# Patient Record
Sex: FEMALE | Race: WHITE
[De-identification: names, ages, dates, MRNs, and addresses within clinical notes are randomized per-mention and may not be internally consistent; named-entity substitution may affect disease eponyms.]

---

## 2020-07-15 ENCOUNTER — HOSPITAL ENCOUNTER (EMERGENCY)
Dept: HOSPITAL 74 - JER | Age: 80
Discharge: HOME | End: 2020-07-15
Payer: COMMERCIAL

## 2020-07-15 VITALS — DIASTOLIC BLOOD PRESSURE: 71 MMHG | SYSTOLIC BLOOD PRESSURE: 190 MMHG

## 2020-07-15 VITALS — HEART RATE: 78 BPM | TEMPERATURE: 98.1 F

## 2020-07-15 VITALS — BODY MASS INDEX: 30 KG/M2

## 2020-07-15 DIAGNOSIS — M25.50: Primary | ICD-10-CM

## 2020-07-15 LAB
ALBUMIN SERPL-MCNC: 3.1 G/DL (ref 3.4–5)
ALP SERPL-CCNC: 96 U/L (ref 45–117)
ALT SERPL-CCNC: 23 U/L (ref 13–61)
ANION GAP SERPL CALC-SCNC: 8 MMOL/L (ref 8–16)
AST SERPL-CCNC: 15 U/L (ref 15–37)
BASOPHILS # BLD: 0.8 % (ref 0–2)
BILIRUB SERPL-MCNC: 0.3 MG/DL (ref 0.2–1)
BUN SERPL-MCNC: 16.4 MG/DL (ref 7–18)
CALCIUM SERPL-MCNC: 9.4 MG/DL (ref 8.5–10.1)
CHLORIDE SERPL-SCNC: 106 MMOL/L (ref 98–107)
CO2 SERPL-SCNC: 25 MMOL/L (ref 21–32)
CREAT SERPL-MCNC: 0.6 MG/DL (ref 0.55–1.3)
DEPRECATED RDW RBC AUTO: 14.4 % (ref 11.6–15.6)
EOSINOPHIL # BLD: 2 % (ref 0–4.5)
ERYTHROCYTE [SEDIMENTATION RATE] IN BLOOD BY WESTERGREN METHOD: 91 MM/HR (ref 0–30)
GLUCOSE SERPL-MCNC: 179 MG/DL (ref 74–106)
HCT VFR BLD CALC: 35.2 % (ref 32.4–45.2)
HGB BLD-MCNC: 11.5 GM/DL (ref 10.7–15.3)
LYMPHOCYTES # BLD: 14.3 % (ref 8–40)
MCH RBC QN AUTO: 27.8 PG (ref 25.7–33.7)
MCHC RBC AUTO-ENTMCNC: 32.7 G/DL (ref 32–36)
MCV RBC: 85.2 FL (ref 80–96)
MONOCYTES # BLD AUTO: 6.4 % (ref 3.8–10.2)
NEUTROPHILS # BLD: 76.5 % (ref 42.8–82.8)
PLATELET # BLD AUTO: 418 K/MM3 (ref 134–434)
PMV BLD: 7.1 FL (ref 7.5–11.1)
POTASSIUM SERPLBLD-SCNC: 4.4 MMOL/L (ref 3.5–5.1)
PROT SERPL-MCNC: 7.1 G/DL (ref 6.4–8.2)
RBC # BLD AUTO: 4.13 M/MM3 (ref 3.6–5.2)
SODIUM SERPL-SCNC: 139 MMOL/L (ref 136–145)
URATE SERPL-SCNC: 3.3 MG/DL (ref 2.6–7.2)
WBC # BLD AUTO: 10.2 K/MM3 (ref 4–10)

## 2020-07-15 PROCEDURE — 3E033NZ INTRODUCTION OF ANALGESICS, HYPNOTICS, SEDATIVES INTO PERIPHERAL VEIN, PERCUTANEOUS APPROACH: ICD-10-PCS

## 2020-07-15 NOTE — PDOC
Documentation entered by Marques Rushing SCRIBE, acting as scribe for 

Joseph Tadeo MD.








Joseph Tadeo MD:  This documentation has been prepared by the [facility]

inMarques SCRIBE, under my direction and personally reviewed by me in its 

entirety.  I confirm that the documentation accurately reflects all work, 

treatment, procedures, and medical decision making performed by me.  





History of Present Illness





- General


Chief Complaint: Chronic pain


Stated Complaint: KNEE PAIN


Time Seen by Provider: 07/15/20 08:42


History Source: Patient


Exam Limitations: No Limitations





- History of Present Illness


Initial Comments: 





07/15/20 09:01


The patient is a 79 year old female with a significant past medical history of 

HL, HTN, DM, asthma who presents to the ED with bilateral knee, bilateral wrist,

bilateral shoulder pain and swelling. Pt was diagnosed with arthritis and stiff 

muscles about one year ago. Pt goes to physical therapy with Dr. Morse but has 

only been twice because of COVID-19. Pt endorses taking Tylenol (last dose 

yesterday) and Motrin for pain relief. 





The patient denies chest pain and shortness of breath. Denies fever, chills 

and/or any GI symptoms. Denies any  symptoms. Denies any other symptoms. 





Allergies: NKDA


PCP: Dr. Pantoja


Ortho: Dr. Morse








Past History





- Medical History


Allergies/Adverse Reactions: 


                                    Allergies











Allergy/AdvReac Type Severity Reaction Status Date / Time


 


No Known Allergies Allergy   Verified 07/15/20 08:18











Home Medications: 


Ambulatory Orders





Atorvastatin Ca [Lipitor] 10 mg PO HS 08/09/16 


Lisinopril [Prinivil] 20 mg PO HS 08/09/16 


Diclofenac Sodium 75 mg PO BID PRN 06/11/20 


Escitalopram Oxalate [Lexapro -] 10 mg PO HS 06/11/20 


Ibuprofen [Motrin Ib] 400 mg PO TID PRN 06/11/20 


Omeprazole 40 mg PO HS 06/11/20 


metFORMIN HCL [Glucophage -] 500 mg PO HS 06/11/20 


Oxycodone HCl/Acetaminophen [Percocet 5-325 mg Tablet] 1 tab PO Q6H #20 tablet 

MDD 3 07/15/20 








Asthma: Yes


Cardiac Disorders: Yes (MURMUR)


COPD: No


Diabetes: Yes


HTN: Yes


Hypercholesterolemia: Yes


Psychiatric Problems: Yes (DEPRESSION)





- Surgical History


Cholecystectomy: Yes





- Psycho-Social/Smoking History


Smoking History: Never smoked


Have you smoked in the past 12 months: No


Information on smoking cessation initiated: No





- Substance Abuse Hx (Audit-C & DAST Scrn)


How often the patient has a drink containing alcohol: Never


Score: In Men: 4 or > Positive; In Women: 3 or > Positive: 0


Screen Result (Pos requires Nsg. Audit-10AR): Negative


In the last yr the pt used illegal drug/Rx for NonMed reason: No


Score:  Yes response is considered Positive: 0


Screen Result (Positive result requires Nsg. DAST-10): Negative





**Review of Systems





- Review of Systems


Able to Perform ROS?: Yes


Comments:: 





07/15/20 09:01


CONSTITUTIONAL: No fever, no chills, no fatigue


EYES: No visual changes


ENT: No ear pain, no sore throat


CARDIOVASCULAR: No chest pain, no palpitations


RESPIRATORY: No cough, no SOB


GI: No abdominal pain, no nausea, no vomiting, no constipation, no diarrhea


GENITOURINARY: No dysuria, no frequency, no hematuria


MUSKULOSKELETAL: +bilateral knee, bilateral wrist, bilateral shoulder pain and 

swelling


SKIN: No rash


NEURO: No headache





All Other Systems: Reviewed and Negative





*Physical Exam





- Vital Signs


                                Last Vital Signs











Temp Pulse Resp BP Pulse Ox


 


 100 F H  86   17   205/83 H  99 


 


 07/15/20 07:35  07/15/20 07:35  07/15/20 07:35  07/15/20 07:35  07/15/20 08:41














- Physical Exam





07/15/20 13:03








EXAMINATION 





CONSTITUTIONAL: Awake and alert, obese, in moderatedistress


HEAD: Normocephalic; atraumatic


EYES: PERRL; EOM intact 


ENMT: External appears normal; normal oropharynx


NECK: Supple; non-tender; no cervical lymphadenopathy


CARD: Normal S1, S2; no murmurs, rubs, or gallops


RESP: Normal chest excursion with respiration; breath sounds clear and equal 

bilaterally; no wheezes, rhonchi, or rales


ABD: Soft, non-distended; non-tender; no palpable organomegaly, no palpable 

hernias


EXT: + Edema is noted to the bilateral wrist joints, bilateral knee joints with 

limited passive range of motion at the shoulder joint, elbows, wrists and knees 

consistent with polyarthritis.


SKIN: Warm, dry, no rash


NEURO: No focal neurological deficiencies. 








ED Treatment Course





- LABORATORY


CBC & Chemistry Diagram: 


                                 07/15/20 09:13





                                 07/15/20 09:13





Medical Decision Making





- Medical Decision Making





07/15/20 13:05


Patient is a 79-year-old female with history of hypertension and arthritis who 

presents with diffuse polyarthritis for the past several days.  I suspect 

osteoarthritis, however, rheumatoid arthritis cannot be ruled out at this time. 

Will administer IV Tylenol.  Will obtain CBC/CMP/CRP/ESR.  I do not suspect Lyme

arthritis at this time.  No indication for imaging.  Gouty arthritis is also 

highly unlikely.  Will discharge with pain meds with outpatient follow-up.





Discharge





- Discharge Information


Problems reviewed: Yes


Clinical Impression/Diagnosis: 


 Arthritis





Joint pain


Qualifiers:


 Joint pain location: unspecified Qualified Code(s): M25.50 - Pain in 

unspecified joint





Condition: Stable





- Follow up/Referral


Referrals: 


Lisa Pantoja [Primary Care Provider] - 





- Patient Discharge Instructions


Patient Printed Discharge Instructions:  [facility]





- Post Discharge Activity

## 2020-10-11 ENCOUNTER — HOSPITAL ENCOUNTER (INPATIENT)
Dept: HOSPITAL 74 - JER | Age: 80
LOS: 4 days | Discharge: ANOTHER HOSPITAL | DRG: 280 | End: 2020-10-15
Attending: INTERNAL MEDICINE | Admitting: HOSPITALIST
Payer: COMMERCIAL

## 2020-10-11 VITALS — BODY MASS INDEX: 30.9 KG/M2

## 2020-10-11 DIAGNOSIS — I48.91: ICD-10-CM

## 2020-10-11 DIAGNOSIS — L50.0: ICD-10-CM

## 2020-10-11 DIAGNOSIS — I10: ICD-10-CM

## 2020-10-11 DIAGNOSIS — I21.4: Primary | ICD-10-CM

## 2020-10-11 DIAGNOSIS — I31.3: ICD-10-CM

## 2020-10-11 DIAGNOSIS — M41.9: ICD-10-CM

## 2020-10-11 DIAGNOSIS — E11.9: ICD-10-CM

## 2020-10-11 DIAGNOSIS — J98.11: ICD-10-CM

## 2020-10-11 DIAGNOSIS — E87.2: ICD-10-CM

## 2020-10-11 DIAGNOSIS — T46.1X5A: ICD-10-CM

## 2020-10-11 DIAGNOSIS — D72.829: ICD-10-CM

## 2020-10-11 DIAGNOSIS — I50.21: ICD-10-CM

## 2020-10-11 DIAGNOSIS — E66.9: ICD-10-CM

## 2020-10-11 DIAGNOSIS — J45.909: ICD-10-CM

## 2020-10-11 DIAGNOSIS — F41.8: ICD-10-CM

## 2020-10-11 DIAGNOSIS — N28.1: ICD-10-CM

## 2020-10-11 DIAGNOSIS — E78.5: ICD-10-CM

## 2020-10-11 DIAGNOSIS — N39.0: ICD-10-CM

## 2020-10-11 LAB
ALBUMIN SERPL-MCNC: 3.8 G/DL (ref 3.4–5)
ALP SERPL-CCNC: 94 U/L (ref 45–117)
ALT SERPL-CCNC: 45 U/L (ref 13–61)
ANION GAP SERPL CALC-SCNC: 7 MMOL/L (ref 8–16)
ANION GAP SERPL CALC-SCNC: 8 MMOL/L (ref 8–16)
APPEARANCE UR: (no result)
APTT BLD: 21.8 SECONDS (ref 25.2–36.5)
AST SERPL-CCNC: 31 U/L (ref 15–37)
BACTERIA # UR AUTO: 735 /UL (ref 0–1359)
BASE EXCESS BLDV CALC-SCNC: -1.1 MMOL/L (ref -2–2)
BASOPHILS # BLD: 0.7 % (ref 0–2)
BILIRUB CONJ SERPL-MCNC: 0.2 MG/DL (ref 0–0.2)
BILIRUB DIRECT SERPL-MCNC: 282 U/L (ref 84–246)
BILIRUB SERPL-MCNC: 0.6 MG/DL (ref 0.2–1)
BILIRUB UR STRIP.AUTO-MCNC: NEGATIVE MG/DL
BNP SERPL-MCNC: 1045 PG/ML (ref 5–450)
BUN SERPL-MCNC: 15 MG/DL (ref 7–18)
BUN SERPL-MCNC: 19.5 MG/DL (ref 7–18)
CALCIUM SERPL-MCNC: 9 MG/DL (ref 8.5–10.1)
CALCIUM SERPL-MCNC: 9.3 MG/DL (ref 8.5–10.1)
CASTS URNS QL MICRO: 1 /UL (ref 0–3.1)
CHLORIDE SERPL-SCNC: 104 MMOL/L (ref 98–107)
CHLORIDE SERPL-SCNC: 107 MMOL/L (ref 98–107)
CO2 SERPL-SCNC: 25 MMOL/L (ref 21–32)
CO2 SERPL-SCNC: 27 MMOL/L (ref 21–32)
COLOR UR: YELLOW
CREAT SERPL-MCNC: 0.6 MG/DL (ref 0.55–1.3)
CREAT SERPL-MCNC: 0.7 MG/DL (ref 0.55–1.3)
DEPRECATED RDW RBC AUTO: 15.5 % (ref 11.6–15.6)
EOSINOPHIL # BLD: 1.6 % (ref 0–4.5)
EPITH CASTS URNS QL MICRO: >36 /UL (ref 0–25.1)
GLUCOSE SERPL-MCNC: 152 MG/DL (ref 74–106)
GLUCOSE SERPL-MCNC: 217 MG/DL (ref 74–106)
HCT VFR BLD CALC: 39 % (ref 32.4–45.2)
HGB BLD-MCNC: 12.8 GM/DL (ref 10.7–15.3)
INR BLD: 1.13 (ref 0.83–1.09)
KETONES UR QL STRIP: NEGATIVE
LEUKOCYTE ESTERASE UR QL STRIP.AUTO: (no result)
LYMPHOCYTES # BLD: 19 % (ref 8–40)
MCH RBC QN AUTO: 28.7 PG (ref 25.7–33.7)
MCHC RBC AUTO-ENTMCNC: 32.8 G/DL (ref 32–36)
MCV RBC: 87.3 FL (ref 80–96)
MONOCYTES # BLD AUTO: 7.6 % (ref 3.8–10.2)
NEUTROPHILS # BLD: 71.1 % (ref 42.8–82.8)
NITRITE UR QL STRIP: NEGATIVE
PCO2 BLDV: 47 MMHG (ref 38–52)
PH BLDV: 7.34 [PH] (ref 7.31–7.41)
PH UR: 6 [PH] (ref 5–8)
PLATELET # BLD AUTO: 280 K/MM3 (ref 134–434)
PMV BLD: 7.9 FL (ref 7.5–11.1)
POTASSIUM SERPLBLD-SCNC: 4.5 MMOL/L (ref 3.5–5.1)
POTASSIUM SERPLBLD-SCNC: 4.5 MMOL/L (ref 3.5–5.1)
PROT SERPL-MCNC: 7.6 G/DL (ref 6.4–8.2)
PROT UR QL STRIP: (no result)
PROT UR QL STRIP: NEGATIVE
PT PNL PPP: 13.3 SEC (ref 9.7–13)
RBC # BLD AUTO: 16.1 /UL (ref 0–23.9)
RBC # BLD AUTO: 4.47 M/MM3 (ref 3.6–5.2)
SAO2 % BLDV: 79.3 % (ref 70–80)
SODIUM SERPL-SCNC: 139 MMOL/L (ref 136–145)
SODIUM SERPL-SCNC: 139 MMOL/L (ref 136–145)
SP GR UR: 1.02 (ref 1.01–1.03)
UROBILINOGEN UR STRIP-MCNC: 0.2 MG/DL (ref 0.2–1)
WBC # BLD AUTO: 10.8 K/MM3 (ref 4–10)
WBC # UR AUTO: 30 /UL (ref 0–25.8)

## 2020-10-11 PROCEDURE — G0008 ADMIN INFLUENZA VIRUS VAC: HCPCS

## 2020-10-11 PROCEDURE — C9803 HOPD COVID-19 SPEC COLLECT: HCPCS

## 2020-10-11 PROCEDURE — U0003 INFECTIOUS AGENT DETECTION BY NUCLEIC ACID (DNA OR RNA); SEVERE ACUTE RESPIRATORY SYNDROME CORONAVIRUS 2 (SARS-COV-2) (CORONAVIRUS DISEASE [COVID-19]), AMPLIFIED PROBE TECHNIQUE, MAKING USE OF HIGH THROUGHPUT TECHNOLOGIES AS DESCRIBED BY CMS-2020-01-R: HCPCS

## 2020-10-11 RX ADMIN — APIXABAN SCH MG: 5 TABLET, FILM COATED ORAL at 22:06

## 2020-10-12 RX ADMIN — LISINOPRIL SCH: 5 TABLET ORAL at 13:36

## 2020-10-12 RX ADMIN — INSULIN ASPART SCH UNITS: 100 INJECTION, SOLUTION INTRAVENOUS; SUBCUTANEOUS at 06:15

## 2020-10-12 RX ADMIN — INSULIN ASPART SCH UNITS: 100 INJECTION, SOLUTION INTRAVENOUS; SUBCUTANEOUS at 17:09

## 2020-10-12 RX ADMIN — APIXABAN SCH MG: 5 TABLET, FILM COATED ORAL at 21:47

## 2020-10-12 RX ADMIN — INSULIN ASPART SCH UNITS: 100 INJECTION, SOLUTION INTRAVENOUS; SUBCUTANEOUS at 21:47

## 2020-10-12 RX ADMIN — ATORVASTATIN CALCIUM SCH MG: 40 TABLET, FILM COATED ORAL at 21:47

## 2020-10-12 RX ADMIN — APIXABAN SCH MG: 5 TABLET, FILM COATED ORAL at 09:22

## 2020-10-12 RX ADMIN — INSULIN ASPART SCH UNITS: 100 INJECTION, SOLUTION INTRAVENOUS; SUBCUTANEOUS at 12:05

## 2020-10-12 RX ADMIN — LISINOPRIL SCH MG: 5 TABLET ORAL at 13:29

## 2020-10-13 LAB
ALBUMIN SERPL-MCNC: 3.6 G/DL (ref 3.4–5)
ALP SERPL-CCNC: 86 U/L (ref 45–117)
ALT SERPL-CCNC: 54 U/L (ref 13–61)
ANION GAP SERPL CALC-SCNC: 5 MMOL/L (ref 8–16)
AST SERPL-CCNC: 43 U/L (ref 15–37)
BASOPHILS # BLD: 0.5 % (ref 0–2)
BILIRUB SERPL-MCNC: 0.6 MG/DL (ref 0.2–1)
BUN SERPL-MCNC: 27.8 MG/DL (ref 7–18)
CALCIUM SERPL-MCNC: 9.1 MG/DL (ref 8.5–10.1)
CHLORIDE SERPL-SCNC: 99 MMOL/L (ref 98–107)
CHOLEST SERPL-MCNC: 175 MG/DL (ref 50–200)
CO2 SERPL-SCNC: 34 MMOL/L (ref 21–32)
CREAT SERPL-MCNC: 0.7 MG/DL (ref 0.55–1.3)
DEPRECATED RDW RBC AUTO: 15.6 % (ref 11.6–15.6)
EOSINOPHIL # BLD: 1.2 % (ref 0–4.5)
GLUCOSE SERPL-MCNC: 120 MG/DL (ref 74–106)
HCT VFR BLD CALC: 39.2 % (ref 32.4–45.2)
HDLC SERPL-MCNC: 50 MG/DL (ref 40–60)
HGB BLD-MCNC: 12.8 GM/DL (ref 10.7–15.3)
LDLC SERPL CALC-MCNC: 98 MG/DL (ref 5–100)
LYMPHOCYTES # BLD: 30.6 % (ref 8–40)
MCH RBC QN AUTO: 28.5 PG (ref 25.7–33.7)
MCHC RBC AUTO-ENTMCNC: 32.7 G/DL (ref 32–36)
MCV RBC: 87.2 FL (ref 80–96)
MONOCYTES # BLD AUTO: 5.7 % (ref 3.8–10.2)
NEUTROPHILS # BLD: 62 % (ref 42.8–82.8)
PLATELET # BLD AUTO: 313 K/MM3 (ref 134–434)
PMV BLD: 8.1 FL (ref 7.5–11.1)
POTASSIUM SERPLBLD-SCNC: 4 MMOL/L (ref 3.5–5.1)
PROT SERPL-MCNC: 7.3 G/DL (ref 6.4–8.2)
RBC # BLD AUTO: 4.5 M/MM3 (ref 3.6–5.2)
SODIUM SERPL-SCNC: 138 MMOL/L (ref 136–145)
TRIGL SERPL-MCNC: 132 MG/DL (ref 0–150)
WBC # BLD AUTO: 13.1 K/MM3 (ref 4–10)

## 2020-10-13 RX ADMIN — ATORVASTATIN CALCIUM SCH MG: 40 TABLET, FILM COATED ORAL at 21:54

## 2020-10-13 RX ADMIN — APIXABAN SCH MG: 5 TABLET, FILM COATED ORAL at 09:01

## 2020-10-13 RX ADMIN — FUROSEMIDE SCH MG: 20 TABLET ORAL at 10:57

## 2020-10-13 RX ADMIN — INSULIN ASPART SCH UNITS: 100 INJECTION, SOLUTION INTRAVENOUS; SUBCUTANEOUS at 06:27

## 2020-10-13 RX ADMIN — INSULIN ASPART SCH UNITS: 100 INJECTION, SOLUTION INTRAVENOUS; SUBCUTANEOUS at 21:54

## 2020-10-13 RX ADMIN — INSULIN ASPART SCH UNITS: 100 INJECTION, SOLUTION INTRAVENOUS; SUBCUTANEOUS at 16:45

## 2020-10-13 RX ADMIN — INSULIN ASPART SCH UNITS: 100 INJECTION, SOLUTION INTRAVENOUS; SUBCUTANEOUS at 11:00

## 2020-10-13 RX ADMIN — LISINOPRIL SCH MG: 5 TABLET ORAL at 10:57

## 2020-10-13 RX ADMIN — HEPARIN SODIUM SCH MLS/HR: 5000 INJECTION, SOLUTION INTRAVENOUS at 11:20

## 2020-10-14 LAB
ALBUMIN SERPL-MCNC: 3 G/DL (ref 3.4–5)
ALP SERPL-CCNC: 72 U/L (ref 45–117)
ALT SERPL-CCNC: 39 U/L (ref 13–61)
ANION GAP SERPL CALC-SCNC: 7 MMOL/L (ref 8–16)
AST SERPL-CCNC: 29 U/L (ref 15–37)
BILIRUB SERPL-MCNC: 0.4 MG/DL (ref 0.2–1)
BUN SERPL-MCNC: 22.1 MG/DL (ref 7–18)
CALCIUM SERPL-MCNC: 8.8 MG/DL (ref 8.5–10.1)
CHLORIDE SERPL-SCNC: 104 MMOL/L (ref 98–107)
CO2 SERPL-SCNC: 30 MMOL/L (ref 21–32)
CREAT SERPL-MCNC: 0.6 MG/DL (ref 0.55–1.3)
GLUCOSE SERPL-MCNC: 129 MG/DL (ref 74–106)
POTASSIUM SERPLBLD-SCNC: 4.1 MMOL/L (ref 3.5–5.1)
PROT SERPL-MCNC: 6 G/DL (ref 6.4–8.2)
SODIUM SERPL-SCNC: 141 MMOL/L (ref 136–145)

## 2020-10-14 RX ADMIN — INSULIN ASPART SCH UNITS: 100 INJECTION, SOLUTION INTRAVENOUS; SUBCUTANEOUS at 21:46

## 2020-10-14 RX ADMIN — ASPIRIN 81 MG SCH MG: 81 TABLET ORAL at 09:30

## 2020-10-14 RX ADMIN — INSULIN ASPART SCH UNITS: 100 INJECTION, SOLUTION INTRAVENOUS; SUBCUTANEOUS at 16:47

## 2020-10-14 RX ADMIN — LISINOPRIL SCH MG: 5 TABLET ORAL at 09:30

## 2020-10-14 RX ADMIN — CEFTRIAXONE SCH MLS/HR: 1 INJECTION, POWDER, FOR SOLUTION INTRAMUSCULAR; INTRAVENOUS at 12:15

## 2020-10-14 RX ADMIN — INSULIN ASPART SCH UNITS: 100 INJECTION, SOLUTION INTRAVENOUS; SUBCUTANEOUS at 11:43

## 2020-10-14 RX ADMIN — CLOPIDOGREL BISULFATE SCH MG: 75 TABLET, FILM COATED ORAL at 09:30

## 2020-10-14 RX ADMIN — ATORVASTATIN CALCIUM SCH MG: 40 TABLET, FILM COATED ORAL at 21:46

## 2020-10-14 RX ADMIN — HEPARIN SODIUM SCH MLS/HR: 5000 INJECTION, SOLUTION INTRAVENOUS at 09:29

## 2020-10-14 RX ADMIN — INSULIN ASPART SCH UNITS: 100 INJECTION, SOLUTION INTRAVENOUS; SUBCUTANEOUS at 05:59

## 2020-10-14 RX ADMIN — FUROSEMIDE SCH MG: 20 TABLET ORAL at 09:30

## 2020-10-15 VITALS — HEART RATE: 126 BPM | SYSTOLIC BLOOD PRESSURE: 138 MMHG | DIASTOLIC BLOOD PRESSURE: 66 MMHG | TEMPERATURE: 97.9 F

## 2020-10-15 LAB
DEPRECATED RDW RBC AUTO: 15.7 % (ref 11.6–15.6)
HCT VFR BLD CALC: 35.4 % (ref 32.4–45.2)
HGB BLD-MCNC: 11.6 GM/DL (ref 10.7–15.3)
MCH RBC QN AUTO: 28.2 PG (ref 25.7–33.7)
MCHC RBC AUTO-ENTMCNC: 32.8 G/DL (ref 32–36)
MCV RBC: 86 FL (ref 80–96)
PLATELET # BLD AUTO: 260 K/MM3 (ref 134–434)
PMV BLD: 8.2 FL (ref 7.5–11.1)
RBC # BLD AUTO: 4.11 M/MM3 (ref 3.6–5.2)
WBC # BLD AUTO: 9.8 K/MM3 (ref 4–10)

## 2020-10-15 RX ADMIN — FUROSEMIDE SCH MG: 20 TABLET ORAL at 09:18

## 2020-10-15 RX ADMIN — INSULIN ASPART SCH UNITS: 100 INJECTION, SOLUTION INTRAVENOUS; SUBCUTANEOUS at 05:59

## 2020-10-15 RX ADMIN — CEFTRIAXONE SCH MLS/HR: 1 INJECTION, POWDER, FOR SOLUTION INTRAMUSCULAR; INTRAVENOUS at 08:51

## 2020-10-15 RX ADMIN — CLOPIDOGREL BISULFATE SCH MG: 75 TABLET, FILM COATED ORAL at 09:18

## 2020-10-15 RX ADMIN — HEPARIN SODIUM SCH: 5000 INJECTION, SOLUTION INTRAVENOUS at 09:35

## 2020-10-15 RX ADMIN — ASPIRIN 81 MG SCH MG: 81 TABLET ORAL at 09:18

## 2020-10-15 RX ADMIN — INSULIN ASPART SCH: 100 INJECTION, SOLUTION INTRAVENOUS; SUBCUTANEOUS at 11:28

## 2020-10-15 RX ADMIN — LISINOPRIL SCH MG: 5 TABLET ORAL at 09:18

## 2021-01-15 ENCOUNTER — HOSPITAL ENCOUNTER (INPATIENT)
Dept: HOSPITAL 74 - JER | Age: 81
LOS: 4 days | Discharge: HOME | DRG: 293 | End: 2021-01-19
Payer: COMMERCIAL

## 2021-01-15 VITALS — BODY MASS INDEX: 29.2 KG/M2

## 2021-01-15 DIAGNOSIS — Z91.14: ICD-10-CM

## 2021-01-15 DIAGNOSIS — I25.10: ICD-10-CM

## 2021-01-15 DIAGNOSIS — J45.909: ICD-10-CM

## 2021-01-15 DIAGNOSIS — E78.5: ICD-10-CM

## 2021-01-15 DIAGNOSIS — I50.23: ICD-10-CM

## 2021-01-15 DIAGNOSIS — M19.90: ICD-10-CM

## 2021-01-15 DIAGNOSIS — I11.0: Primary | ICD-10-CM

## 2021-01-15 DIAGNOSIS — F41.8: ICD-10-CM

## 2021-01-15 DIAGNOSIS — Z95.5: ICD-10-CM

## 2021-01-15 DIAGNOSIS — I34.0: ICD-10-CM

## 2021-01-15 DIAGNOSIS — Z20.828: ICD-10-CM

## 2021-01-15 DIAGNOSIS — I25.2: ICD-10-CM

## 2021-01-15 DIAGNOSIS — I48.0: ICD-10-CM

## 2021-01-15 DIAGNOSIS — E11.9: ICD-10-CM

## 2021-01-15 LAB
ALBUMIN SERPL-MCNC: 3.7 G/DL (ref 3.4–5)
ALP SERPL-CCNC: 97 U/L (ref 45–117)
ALT SERPL-CCNC: 48 U/L (ref 13–61)
ANION GAP SERPL CALC-SCNC: 5 MMOL/L (ref 8–16)
AST SERPL-CCNC: 41 U/L (ref 15–37)
BASOPHILS # BLD: 0.8 % (ref 0–2)
BILIRUB SERPL-MCNC: 0.8 MG/DL (ref 0.2–1)
BNP SERPL-MCNC: 4646 PG/ML (ref 5–450)
BUN SERPL-MCNC: 21.5 MG/DL (ref 7–18)
CALCIUM SERPL-MCNC: 9.1 MG/DL (ref 8.5–10.1)
CHLORIDE SERPL-SCNC: 107 MMOL/L (ref 98–107)
CO2 SERPL-SCNC: 27 MMOL/L (ref 21–32)
CREAT SERPL-MCNC: 0.7 MG/DL (ref 0.55–1.3)
DEPRECATED RDW RBC AUTO: 15.7 % (ref 11.6–15.6)
EOSINOPHIL # BLD: 2.6 % (ref 0–4.5)
GLUCOSE SERPL-MCNC: 145 MG/DL (ref 74–106)
HCT VFR BLD CALC: 39.7 % (ref 32.4–45.2)
HGB BLD-MCNC: 13 GM/DL (ref 10.7–15.3)
LYMPHOCYTES # BLD: 12.8 % (ref 8–40)
MAGNESIUM SERPL-MCNC: 1.4 MG/DL (ref 1.8–2.4)
MCH RBC QN AUTO: 28.6 PG (ref 25.7–33.7)
MCHC RBC AUTO-ENTMCNC: 32.8 G/DL (ref 32–36)
MCV RBC: 87.4 FL (ref 80–96)
MONOCYTES # BLD AUTO: 5.1 % (ref 3.8–10.2)
NEUTROPHILS # BLD: 78.7 % (ref 42.8–82.8)
PLATELET # BLD AUTO: 264 K/MM3 (ref 134–434)
PMV BLD: 8.6 FL (ref 7.5–11.1)
POTASSIUM SERPLBLD-SCNC: 4.8 MMOL/L (ref 3.5–5.1)
PROT SERPL-MCNC: 7.5 G/DL (ref 6.4–8.2)
RBC # BLD AUTO: 4.55 M/MM3 (ref 3.6–5.2)
SODIUM SERPL-SCNC: 139 MMOL/L (ref 136–145)
WBC # BLD AUTO: 9.2 K/MM3 (ref 4–10)

## 2021-01-15 PROCEDURE — U0003 INFECTIOUS AGENT DETECTION BY NUCLEIC ACID (DNA OR RNA); SEVERE ACUTE RESPIRATORY SYNDROME CORONAVIRUS 2 (SARS-COV-2) (CORONAVIRUS DISEASE [COVID-19]), AMPLIFIED PROBE TECHNIQUE, MAKING USE OF HIGH THROUGHPUT TECHNOLOGIES AS DESCRIBED BY CMS-2020-01-R: HCPCS

## 2021-01-15 PROCEDURE — C9803 HOPD COVID-19 SPEC COLLECT: HCPCS

## 2021-01-15 RX ADMIN — APIXABAN SCH MG: 5 TABLET, FILM COATED ORAL at 21:20

## 2021-01-15 RX ADMIN — ATORVASTATIN CALCIUM SCH MG: 80 TABLET, FILM COATED ORAL at 21:20

## 2021-01-15 RX ADMIN — FUROSEMIDE SCH MG: 10 INJECTION, SOLUTION INTRAVENOUS at 19:42

## 2021-01-15 RX ADMIN — MAGNESIUM OXIDE TAB 400 MG (241.3 MG ELEMENTAL MG) SCH MG: 400 (241.3 MG) TAB at 21:20

## 2021-01-16 LAB
ALBUMIN SERPL-MCNC: 3.5 G/DL (ref 3.4–5)
ALP SERPL-CCNC: 93 U/L (ref 45–117)
ALT SERPL-CCNC: 52 U/L (ref 13–61)
ANION GAP SERPL CALC-SCNC: 9 MMOL/L (ref 8–16)
AST SERPL-CCNC: 28 U/L (ref 15–37)
BASOPHILS # BLD: 0.3 % (ref 0–2)
BILIRUB SERPL-MCNC: 0.6 MG/DL (ref 0.2–1)
BUN SERPL-MCNC: 28.2 MG/DL (ref 7–18)
CALCIUM SERPL-MCNC: 9.3 MG/DL (ref 8.5–10.1)
CHLORIDE SERPL-SCNC: 108 MMOL/L (ref 98–107)
CHOLEST SERPL-MCNC: 147 MG/DL (ref 50–200)
CO2 SERPL-SCNC: 25 MMOL/L (ref 21–32)
CREAT SERPL-MCNC: 0.9 MG/DL (ref 0.55–1.3)
DEPRECATED RDW RBC AUTO: 16.2 % (ref 11.6–15.6)
EOSINOPHIL # BLD: 0 % (ref 0–4.5)
GLUCOSE SERPL-MCNC: 131 MG/DL (ref 74–106)
HCT VFR BLD CALC: 38 % (ref 32.4–45.2)
HDLC SERPL-MCNC: 55 MG/DL (ref 40–60)
HGB BLD-MCNC: 12.4 GM/DL (ref 10.7–15.3)
LDLC SERPL CALC-MCNC: 75 MG/DL (ref 5–100)
LYMPHOCYTES # BLD: 15 % (ref 8–40)
MAGNESIUM SERPL-MCNC: 1.7 MG/DL (ref 1.8–2.4)
MCH RBC QN AUTO: 28.4 PG (ref 25.7–33.7)
MCHC RBC AUTO-ENTMCNC: 32.6 G/DL (ref 32–36)
MCV RBC: 86.9 FL (ref 80–96)
MONOCYTES # BLD AUTO: 7.2 % (ref 3.8–10.2)
NEUTROPHILS # BLD: 77.5 % (ref 42.8–82.8)
PLATELET # BLD AUTO: 278 K/MM3 (ref 134–434)
PMV BLD: 8.8 FL (ref 7.5–11.1)
POTASSIUM SERPLBLD-SCNC: 4.4 MMOL/L (ref 3.5–5.1)
PROT SERPL-MCNC: 7 G/DL (ref 6.4–8.2)
RBC # BLD AUTO: 4.37 M/MM3 (ref 3.6–5.2)
SODIUM SERPL-SCNC: 141 MMOL/L (ref 136–145)
TRIGL SERPL-MCNC: 77 MG/DL (ref 0–150)
WBC # BLD AUTO: 12.3 K/MM3 (ref 4–10)

## 2021-01-16 RX ADMIN — ATORVASTATIN CALCIUM SCH MG: 80 TABLET, FILM COATED ORAL at 21:15

## 2021-01-16 RX ADMIN — SPIRONOLACTONE SCH MG: 25 TABLET, FILM COATED ORAL at 09:26

## 2021-01-16 RX ADMIN — FUROSEMIDE SCH MG: 10 INJECTION, SOLUTION INTRAVENOUS at 06:32

## 2021-01-16 RX ADMIN — APIXABAN SCH MG: 5 TABLET, FILM COATED ORAL at 21:15

## 2021-01-16 RX ADMIN — MAGNESIUM OXIDE TAB 400 MG (241.3 MG ELEMENTAL MG) SCH MG: 400 (241.3 MG) TAB at 21:15

## 2021-01-16 RX ADMIN — INSULIN ASPART SCH UNITS: 100 INJECTION, SOLUTION INTRAVENOUS; SUBCUTANEOUS at 06:31

## 2021-01-16 RX ADMIN — MAGNESIUM OXIDE TAB 400 MG (241.3 MG ELEMENTAL MG) SCH MG: 400 (241.3 MG) TAB at 09:26

## 2021-01-16 RX ADMIN — ASPIRIN 81 MG SCH MG: 81 TABLET ORAL at 09:26

## 2021-01-16 RX ADMIN — APIXABAN SCH MG: 5 TABLET, FILM COATED ORAL at 09:26

## 2021-01-16 RX ADMIN — FUROSEMIDE SCH MG: 10 INJECTION, SOLUTION INTRAVENOUS at 13:09

## 2021-01-16 RX ADMIN — PANTOPRAZOLE SODIUM SCH MG: 40 TABLET, DELAYED RELEASE ORAL at 09:26

## 2021-01-16 RX ADMIN — INSULIN ASPART SCH UNITS: 100 INJECTION, SOLUTION INTRAVENOUS; SUBCUTANEOUS at 16:37

## 2021-01-16 RX ADMIN — LISINOPRIL SCH MG: 5 TABLET ORAL at 09:26

## 2021-01-16 RX ADMIN — CLOPIDOGREL BISULFATE SCH MG: 75 TABLET, FILM COATED ORAL at 09:26

## 2021-01-17 LAB
ANION GAP SERPL CALC-SCNC: 6 MMOL/L (ref 8–16)
BUN SERPL-MCNC: 35.5 MG/DL (ref 7–18)
CALCIUM SERPL-MCNC: 8.9 MG/DL (ref 8.5–10.1)
CHLORIDE SERPL-SCNC: 104 MMOL/L (ref 98–107)
CO2 SERPL-SCNC: 30 MMOL/L (ref 21–32)
CREAT SERPL-MCNC: 0.9 MG/DL (ref 0.55–1.3)
GLUCOSE SERPL-MCNC: 109 MG/DL (ref 74–106)
MAGNESIUM SERPL-MCNC: 1.8 MG/DL (ref 1.8–2.4)
POTASSIUM SERPLBLD-SCNC: 3.9 MMOL/L (ref 3.5–5.1)
SODIUM SERPL-SCNC: 140 MMOL/L (ref 136–145)

## 2021-01-17 RX ADMIN — ACETAMINOPHEN PRN MG: 325 TABLET ORAL at 05:56

## 2021-01-17 RX ADMIN — INSULIN ASPART SCH UNITS: 100 INJECTION, SOLUTION INTRAVENOUS; SUBCUTANEOUS at 06:15

## 2021-01-17 RX ADMIN — INSULIN ASPART SCH: 100 INJECTION, SOLUTION INTRAVENOUS; SUBCUTANEOUS at 17:38

## 2021-01-17 RX ADMIN — ACETAMINOPHEN PRN MG: 325 TABLET ORAL at 23:12

## 2021-01-17 RX ADMIN — APIXABAN SCH MG: 5 TABLET, FILM COATED ORAL at 10:59

## 2021-01-17 RX ADMIN — SPIRONOLACTONE SCH MG: 25 TABLET, FILM COATED ORAL at 11:00

## 2021-01-17 RX ADMIN — FUROSEMIDE SCH MG: 10 INJECTION, SOLUTION INTRAVENOUS at 05:57

## 2021-01-17 RX ADMIN — MAGNESIUM OXIDE TAB 400 MG (241.3 MG ELEMENTAL MG) SCH MG: 400 (241.3 MG) TAB at 11:00

## 2021-01-17 RX ADMIN — PANTOPRAZOLE SODIUM SCH MG: 40 TABLET, DELAYED RELEASE ORAL at 11:00

## 2021-01-17 RX ADMIN — MAGNESIUM OXIDE TAB 400 MG (241.3 MG ELEMENTAL MG) SCH MG: 400 (241.3 MG) TAB at 23:12

## 2021-01-17 RX ADMIN — LISINOPRIL SCH MG: 5 TABLET ORAL at 11:00

## 2021-01-17 RX ADMIN — ESCITALOPRAM OXALATE SCH MG: 10 TABLET, FILM COATED ORAL at 14:55

## 2021-01-17 RX ADMIN — ATORVASTATIN CALCIUM SCH MG: 80 TABLET, FILM COATED ORAL at 23:12

## 2021-01-17 RX ADMIN — CLOPIDOGREL BISULFATE SCH MG: 75 TABLET, FILM COATED ORAL at 11:00

## 2021-01-17 RX ADMIN — ASPIRIN 81 MG SCH MG: 81 TABLET ORAL at 11:00

## 2021-01-17 RX ADMIN — FUROSEMIDE SCH MG: 10 INJECTION, SOLUTION INTRAVENOUS at 14:54

## 2021-01-17 RX ADMIN — APIXABAN SCH MG: 5 TABLET, FILM COATED ORAL at 23:12

## 2021-01-18 RX ADMIN — ESCITALOPRAM OXALATE SCH MG: 10 TABLET, FILM COATED ORAL at 09:07

## 2021-01-18 RX ADMIN — PANTOPRAZOLE SODIUM SCH MG: 40 TABLET, DELAYED RELEASE ORAL at 09:07

## 2021-01-18 RX ADMIN — APIXABAN SCH MG: 5 TABLET, FILM COATED ORAL at 23:56

## 2021-01-18 RX ADMIN — FUROSEMIDE SCH MG: 10 INJECTION, SOLUTION INTRAVENOUS at 14:17

## 2021-01-18 RX ADMIN — CLOPIDOGREL BISULFATE SCH MG: 75 TABLET, FILM COATED ORAL at 09:07

## 2021-01-18 RX ADMIN — APIXABAN SCH MG: 5 TABLET, FILM COATED ORAL at 09:07

## 2021-01-18 RX ADMIN — INSULIN ASPART SCH: 100 INJECTION, SOLUTION INTRAVENOUS; SUBCUTANEOUS at 18:07

## 2021-01-18 RX ADMIN — MAGNESIUM OXIDE TAB 400 MG (241.3 MG ELEMENTAL MG) SCH MG: 400 (241.3 MG) TAB at 23:56

## 2021-01-18 RX ADMIN — ASPIRIN 81 MG SCH MG: 81 TABLET ORAL at 09:10

## 2021-01-18 RX ADMIN — MAGNESIUM OXIDE TAB 400 MG (241.3 MG ELEMENTAL MG) SCH MG: 400 (241.3 MG) TAB at 09:07

## 2021-01-18 RX ADMIN — LISINOPRIL SCH MG: 5 TABLET ORAL at 09:08

## 2021-01-18 RX ADMIN — SPIRONOLACTONE SCH MG: 25 TABLET, FILM COATED ORAL at 09:10

## 2021-01-18 RX ADMIN — FUROSEMIDE SCH MG: 10 INJECTION, SOLUTION INTRAVENOUS at 07:25

## 2021-01-18 RX ADMIN — INSULIN ASPART SCH: 100 INJECTION, SOLUTION INTRAVENOUS; SUBCUTANEOUS at 07:25

## 2021-01-18 RX ADMIN — ACETAMINOPHEN PRN MG: 325 TABLET ORAL at 23:56

## 2021-01-18 RX ADMIN — ATORVASTATIN CALCIUM SCH MG: 80 TABLET, FILM COATED ORAL at 23:56

## 2021-01-19 VITALS — TEMPERATURE: 97.9 F | HEART RATE: 75 BPM | DIASTOLIC BLOOD PRESSURE: 68 MMHG | SYSTOLIC BLOOD PRESSURE: 129 MMHG

## 2021-01-19 LAB
ANION GAP SERPL CALC-SCNC: 8 MMOL/L (ref 8–16)
BASOPHILS # BLD: 1.2 % (ref 0–2)
BUN SERPL-MCNC: 30.4 MG/DL (ref 7–18)
CALCIUM SERPL-MCNC: 9 MG/DL (ref 8.5–10.1)
CHLORIDE SERPL-SCNC: 101 MMOL/L (ref 98–107)
CO2 SERPL-SCNC: 30 MMOL/L (ref 21–32)
CREAT SERPL-MCNC: 0.8 MG/DL (ref 0.55–1.3)
DEPRECATED RDW RBC AUTO: 15.4 % (ref 11.6–15.6)
EOSINOPHIL # BLD: 5.6 % (ref 0–4.5)
GLUCOSE SERPL-MCNC: 130 MG/DL (ref 74–106)
HCT VFR BLD CALC: 40.1 % (ref 32.4–45.2)
HGB BLD-MCNC: 13.2 GM/DL (ref 10.7–15.3)
LYMPHOCYTES # BLD: 25.3 % (ref 8–40)
MCH RBC QN AUTO: 28.6 PG (ref 25.7–33.7)
MCHC RBC AUTO-ENTMCNC: 33 G/DL (ref 32–36)
MCV RBC: 86.7 FL (ref 80–96)
MONOCYTES # BLD AUTO: 8.6 % (ref 3.8–10.2)
NEUTROPHILS # BLD: 59.3 % (ref 42.8–82.8)
PLATELET # BLD AUTO: 310 K/MM3 (ref 134–434)
PMV BLD: 8 FL (ref 7.5–11.1)
POTASSIUM SERPLBLD-SCNC: 4.2 MMOL/L (ref 3.5–5.1)
RBC # BLD AUTO: 4.63 M/MM3 (ref 3.6–5.2)
SODIUM SERPL-SCNC: 139 MMOL/L (ref 136–145)
WBC # BLD AUTO: 9.8 K/MM3 (ref 4–10)

## 2021-01-19 RX ADMIN — INSULIN ASPART SCH: 100 INJECTION, SOLUTION INTRAVENOUS; SUBCUTANEOUS at 06:59

## 2021-01-19 RX ADMIN — SPIRONOLACTONE SCH MG: 25 TABLET, FILM COATED ORAL at 09:24

## 2021-01-19 RX ADMIN — MAGNESIUM OXIDE TAB 400 MG (241.3 MG ELEMENTAL MG) SCH MG: 400 (241.3 MG) TAB at 09:24

## 2021-01-19 RX ADMIN — APIXABAN SCH MG: 5 TABLET, FILM COATED ORAL at 09:24

## 2021-01-19 RX ADMIN — LISINOPRIL SCH MG: 5 TABLET ORAL at 09:24

## 2021-01-19 RX ADMIN — ASPIRIN 81 MG SCH MG: 81 TABLET ORAL at 09:24

## 2021-01-19 RX ADMIN — CLOPIDOGREL BISULFATE SCH MG: 75 TABLET, FILM COATED ORAL at 09:24

## 2021-01-19 RX ADMIN — PANTOPRAZOLE SODIUM SCH MG: 40 TABLET, DELAYED RELEASE ORAL at 09:25

## 2021-01-19 RX ADMIN — FUROSEMIDE SCH MG: 10 INJECTION, SOLUTION INTRAVENOUS at 06:59

## 2021-01-19 RX ADMIN — ESCITALOPRAM OXALATE SCH MG: 10 TABLET, FILM COATED ORAL at 09:24

## 2023-03-16 ENCOUNTER — HOSPITAL ENCOUNTER (OUTPATIENT)
Dept: HOSPITAL 74 - JER | Age: 83
Setting detail: OBSERVATION
LOS: 1 days | Discharge: HOME | End: 2023-03-17
Attending: INTERNAL MEDICINE | Admitting: INTERNAL MEDICINE
Payer: COMMERCIAL

## 2023-03-16 VITALS — BODY MASS INDEX: 30 KG/M2

## 2023-03-16 DIAGNOSIS — E11.9: ICD-10-CM

## 2023-03-16 DIAGNOSIS — F32.A: ICD-10-CM

## 2023-03-16 DIAGNOSIS — Z79.01: ICD-10-CM

## 2023-03-16 DIAGNOSIS — I48.91: Primary | ICD-10-CM

## 2023-03-16 DIAGNOSIS — J45.909: ICD-10-CM

## 2023-03-16 DIAGNOSIS — Z79.84: ICD-10-CM

## 2023-03-16 LAB
ALBUMIN SERPL-MCNC: 3.3 G/DL (ref 3.4–5)
ALP SERPL-CCNC: 86 U/L (ref 45–117)
ALT SERPL-CCNC: 41 U/L (ref 13–61)
ANION GAP SERPL CALC-SCNC: 6 MMOL/L (ref 8–16)
AST SERPL-CCNC: 35 U/L (ref 15–37)
BASE EXCESS BLDV CALC-SCNC: 1.1 MMOL/L (ref -2–2)
BASOPHILS # BLD: 1.2 % (ref 0–2)
BILIRUB SERPL-MCNC: 0.6 MG/DL (ref 0.2–1)
BUN SERPL-MCNC: 14.3 MG/DL (ref 7–18)
CALCIUM SERPL-MCNC: 9.1 MG/DL (ref 8.5–10.1)
CHLORIDE SERPL-SCNC: 108 MMOL/L (ref 98–107)
CO2 SERPL-SCNC: 26 MMOL/L (ref 21–32)
CREAT SERPL-MCNC: 0.7 MG/DL (ref 0.55–1.3)
DEPRECATED RDW RBC AUTO: 13.6 % (ref 11.6–15.6)
EOSINOPHIL # BLD: 6.2 % (ref 0–4.5)
GLUCOSE SERPL-MCNC: 141 MG/DL (ref 74–106)
HCT VFR BLD CALC: 37 % (ref 32.4–45.2)
HCT VFR BLDV CALC: 40 % (ref 32.4–45.2)
HGB BLD-MCNC: 12.2 GM/DL (ref 10.7–15.3)
LYMPHOCYTES # BLD: 19.7 % (ref 8–40)
MCH RBC QN AUTO: 28.4 PG (ref 25.7–33.7)
MCHC RBC AUTO-ENTMCNC: 32.9 G/DL (ref 32–36)
MCV RBC: 86.5 FL (ref 80–96)
MONOCYTES # BLD AUTO: 6.4 % (ref 3.8–10.2)
NEUTROPHILS # BLD: 66.5 % (ref 42.8–82.8)
PCO2 BLDV: 40.4 MMHG (ref 38–52)
PH BLDV: 7.42 [PH] (ref 7.31–7.41)
PLATELET # BLD AUTO: 264 10^3/UL (ref 134–434)
PMV BLD: 8.2 FL (ref 7.5–11.1)
PROT SERPL-MCNC: 6.9 G/DL (ref 6.4–8.2)
RBC # BLD AUTO: 4.28 M/MM3 (ref 3.6–5.2)
SAO2 % BLDV: 90.4 % (ref 70–80)
SODIUM SERPL-SCNC: 140 MMOL/L (ref 136–145)
WBC # BLD AUTO: 8.3 K/MM3 (ref 4–10)

## 2023-03-16 PROCEDURE — 3E033NZ INTRODUCTION OF ANALGESICS, HYPNOTICS, SEDATIVES INTO PERIPHERAL VEIN, PERCUTANEOUS APPROACH: ICD-10-PCS | Performed by: INTERNAL MEDICINE

## 2023-03-16 PROCEDURE — 3E013VG INTRODUCTION OF INSULIN INTO SUBCUTANEOUS TISSUE, PERCUTANEOUS APPROACH: ICD-10-PCS | Performed by: INTERNAL MEDICINE

## 2023-03-16 PROCEDURE — 3E033GC INTRODUCTION OF OTHER THERAPEUTIC SUBSTANCE INTO PERIPHERAL VEIN, PERCUTANEOUS APPROACH: ICD-10-PCS | Performed by: INTERNAL MEDICINE

## 2023-03-16 PROCEDURE — G0378 HOSPITAL OBSERVATION PER HR: HCPCS

## 2023-03-16 RX ADMIN — APIXABAN SCH MG: 5 TABLET, FILM COATED ORAL at 21:41

## 2023-03-17 VITALS — RESPIRATION RATE: 18 BRPM

## 2023-03-17 VITALS — HEART RATE: 88 BPM | DIASTOLIC BLOOD PRESSURE: 65 MMHG | SYSTOLIC BLOOD PRESSURE: 120 MMHG | TEMPERATURE: 97.6 F

## 2023-03-17 LAB
ALBUMIN SERPL-MCNC: 3.1 G/DL (ref 3.4–5)
ALP SERPL-CCNC: 79 U/L (ref 45–117)
ALT SERPL-CCNC: 34 U/L (ref 13–61)
ANION GAP SERPL CALC-SCNC: 6 MMOL/L (ref 8–16)
AST SERPL-CCNC: 22 U/L (ref 15–37)
BASOPHILS # BLD: 0.3 % (ref 0–2)
BILIRUB SERPL-MCNC: 0.6 MG/DL (ref 0.2–1)
BUN SERPL-MCNC: 30.5 MG/DL (ref 7–18)
CALCIUM SERPL-MCNC: 8.8 MG/DL (ref 8.5–10.1)
CHLORIDE SERPL-SCNC: 106 MMOL/L (ref 98–107)
CHOLEST SERPL-MCNC: 179 MG/DL (ref 50–200)
CO2 SERPL-SCNC: 27 MMOL/L (ref 21–32)
CREAT SERPL-MCNC: 0.9 MG/DL (ref 0.55–1.3)
DEPRECATED RDW RBC AUTO: 14.1 % (ref 11.6–15.6)
EOSINOPHIL # BLD: 0 % (ref 0–4.5)
GLUCOSE SERPL-MCNC: 178 MG/DL (ref 74–106)
HCT VFR BLD CALC: 36.3 % (ref 32.4–45.2)
HDLC SERPL-MCNC: 49 MG/DL (ref 40–60)
HGB BLD-MCNC: 11.9 GM/DL (ref 10.7–15.3)
LDLC SERPL CALC-MCNC: 117 MG/DL (ref 5–100)
LYMPHOCYTES # BLD: 13.9 % (ref 8–40)
MAGNESIUM SERPL-MCNC: 1.9 MG/DL (ref 1.8–2.4)
MCH RBC QN AUTO: 28.9 PG (ref 25.7–33.7)
MCHC RBC AUTO-ENTMCNC: 32.8 G/DL (ref 32–36)
MCV RBC: 88.2 FL (ref 80–96)
MONOCYTES # BLD AUTO: 3.6 % (ref 3.8–10.2)
NEUTROPHILS # BLD: 82.2 % (ref 42.8–82.8)
PHOSPHATE SERPL-MCNC: 5.2 MG/DL (ref 2.5–4.9)
PLATELET # BLD AUTO: 268 10^3/UL (ref 134–434)
PMV BLD: 8.3 FL (ref 7.5–11.1)
PROT SERPL-MCNC: 6.5 G/DL (ref 6.4–8.2)
RBC # BLD AUTO: 4.12 M/MM3 (ref 3.6–5.2)
SODIUM SERPL-SCNC: 139 MMOL/L (ref 136–145)
TRIGL SERPL-MCNC: 88 MG/DL (ref 0–150)
WBC # BLD AUTO: 9.4 K/MM3 (ref 4–10)

## 2023-03-17 RX ADMIN — INSULIN ASPART SCH UNITS: 100 INJECTION, SOLUTION INTRAVENOUS; SUBCUTANEOUS at 06:37

## 2023-03-17 RX ADMIN — APIXABAN SCH MG: 5 TABLET, FILM COATED ORAL at 09:23

## 2023-03-17 RX ADMIN — INSULIN ASPART SCH UNITS: 100 INJECTION, SOLUTION INTRAVENOUS; SUBCUTANEOUS at 12:11

## 2023-03-24 ENCOUNTER — HOSPITAL ENCOUNTER (EMERGENCY)
Dept: HOSPITAL 74 - JER | Age: 83
Discharge: HOME | End: 2023-03-24
Payer: COMMERCIAL

## 2023-03-24 VITALS
RESPIRATION RATE: 17 BRPM | HEART RATE: 81 BPM | TEMPERATURE: 97.8 F | DIASTOLIC BLOOD PRESSURE: 68 MMHG | SYSTOLIC BLOOD PRESSURE: 132 MMHG

## 2023-03-24 VITALS — BODY MASS INDEX: 26.5 KG/M2

## 2023-03-24 DIAGNOSIS — K55.1: Primary | ICD-10-CM

## 2023-03-24 DIAGNOSIS — I25.10: ICD-10-CM

## 2023-03-24 DIAGNOSIS — I50.9: ICD-10-CM

## 2023-03-24 DIAGNOSIS — N39.0: ICD-10-CM

## 2023-03-24 DIAGNOSIS — I48.91: ICD-10-CM

## 2023-03-24 DIAGNOSIS — K57.90: ICD-10-CM

## 2023-03-24 LAB
ALBUMIN SERPL-MCNC: 3.4 G/DL (ref 3.4–5)
ALP SERPL-CCNC: 73 U/L (ref 45–117)
ALT SERPL-CCNC: 39 U/L (ref 13–61)
ANION GAP SERPL CALC-SCNC: 6 MMOL/L (ref 8–16)
APPEARANCE UR: CLEAR
AST SERPL-CCNC: 48 U/L (ref 15–37)
BACTERIA # UR AUTO: 263.5 /UL (ref 0–1359)
BASOPHILS # BLD: 0.6 % (ref 0–2)
BILIRUB SERPL-MCNC: 0.6 MG/DL (ref 0.2–1)
BILIRUB UR STRIP.AUTO-MCNC: NEGATIVE MG/DL
BUN SERPL-MCNC: 26.7 MG/DL (ref 7–18)
CALCIUM SERPL-MCNC: 9.2 MG/DL (ref 8.5–10.1)
CASTS URNS QL MICRO: 3.14 /UL (ref 0–3.1)
CHLORIDE SERPL-SCNC: 106 MMOL/L (ref 98–107)
CO2 SERPL-SCNC: 26 MMOL/L (ref 21–32)
COLOR UR: YELLOW
CREAT SERPL-MCNC: 1.3 MG/DL (ref 0.55–1.3)
DEPRECATED RDW RBC AUTO: 14.1 % (ref 11.6–15.6)
EOSINOPHIL # BLD: 2.6 % (ref 0–4.5)
EPITH CASTS URNS QL MICRO: 39.1 /UL (ref 0–25.1)
GLUCOSE SERPL-MCNC: 173 MG/DL (ref 74–106)
HCT VFR BLD CALC: 42.8 % (ref 32.4–45.2)
HGB BLD-MCNC: 14.3 GM/DL (ref 10.7–15.3)
KETONES UR QL STRIP: NEGATIVE
LEUKOCYTE ESTERASE UR QL STRIP.AUTO: (no result)
LIPASE SERPL-CCNC: 60 U/L (ref 73–393)
LYMPHOCYTES # BLD: 13.5 % (ref 8–40)
MAGNESIUM SERPL-MCNC: 1.5 MG/DL (ref 1.8–2.4)
MCH RBC QN AUTO: 28.7 PG (ref 25.7–33.7)
MCHC RBC AUTO-ENTMCNC: 33.3 G/DL (ref 32–36)
MCV RBC: 86 FL (ref 80–96)
MONOCYTES # BLD AUTO: 5.5 % (ref 3.8–10.2)
NEUTROPHILS # BLD: 77.8 % (ref 42.8–82.8)
NITRITE UR QL STRIP: NEGATIVE
PH UR: 5 [PH] (ref 5–8)
PLATELET # BLD AUTO: 322 10^3/UL (ref 134–434)
PMV BLD: 8 FL (ref 7.5–11.1)
PROT SERPL-MCNC: 6.8 G/DL (ref 6.4–8.2)
PROT UR QL STRIP: NEGATIVE
PROT UR QL STRIP: NEGATIVE
RBC # BLD AUTO: 10.6 /UL (ref 0–23.9)
RBC # BLD AUTO: 4.98 M/MM3 (ref 3.6–5.2)
SODIUM SERPL-SCNC: 137 MMOL/L (ref 136–145)
SP GR UR: 1.08 (ref 1.01–1.03)
UROBILINOGEN UR STRIP-MCNC: 0.2 MG/DL (ref 0.2–1)
WBC # BLD AUTO: 15.3 K/MM3 (ref 4–10)
WBC # UR AUTO: 125.7 /UL (ref 0–25.8)

## 2023-03-24 PROCEDURE — 3E033GC INTRODUCTION OF OTHER THERAPEUTIC SUBSTANCE INTO PERIPHERAL VEIN, PERCUTANEOUS APPROACH: ICD-10-PCS

## 2023-04-27 ENCOUNTER — HOSPITAL ENCOUNTER (OUTPATIENT)
Dept: HOSPITAL 74 - JER | Age: 83
Setting detail: OBSERVATION
LOS: 1 days | Discharge: HOME | End: 2023-04-28
Attending: INTERNAL MEDICINE | Admitting: INTERNAL MEDICINE
Payer: COMMERCIAL

## 2023-04-27 VITALS — BODY MASS INDEX: 29.7 KG/M2

## 2023-04-27 DIAGNOSIS — Z88.8: ICD-10-CM

## 2023-04-27 DIAGNOSIS — F32.A: ICD-10-CM

## 2023-04-27 DIAGNOSIS — I50.20: ICD-10-CM

## 2023-04-27 DIAGNOSIS — I48.91: ICD-10-CM

## 2023-04-27 DIAGNOSIS — J45.901: Primary | ICD-10-CM

## 2023-04-27 DIAGNOSIS — E78.5: ICD-10-CM

## 2023-04-27 DIAGNOSIS — E11.9: ICD-10-CM

## 2023-04-27 DIAGNOSIS — R00.0: ICD-10-CM

## 2023-04-27 DIAGNOSIS — Z91.199: ICD-10-CM

## 2023-04-27 DIAGNOSIS — I11.0: ICD-10-CM

## 2023-04-27 LAB
ALBUMIN SERPL-MCNC: 3.6 G/DL (ref 3.4–5)
ALP SERPL-CCNC: 69 U/L (ref 45–117)
ALT SERPL-CCNC: 32 U/L (ref 13–61)
ANION GAP SERPL CALC-SCNC: 7 MMOL/L (ref 8–16)
AST SERPL-CCNC: 33 U/L (ref 15–37)
BASOPHILS # BLD: 1 % (ref 0–2)
BILIRUB SERPL-MCNC: 0.6 MG/DL (ref 0.2–1)
BNP SERPL-MCNC: 3520.5 PG/ML (ref 5–450)
BUN SERPL-MCNC: 20.1 MG/DL (ref 7–18)
CALCIUM SERPL-MCNC: 9.2 MG/DL (ref 8.5–10.1)
CHLORIDE SERPL-SCNC: 107 MMOL/L (ref 98–107)
CO2 SERPL-SCNC: 27 MMOL/L (ref 21–32)
CREAT SERPL-MCNC: 0.9 MG/DL (ref 0.55–1.3)
DEPRECATED RDW RBC AUTO: 14.7 % (ref 11.6–15.6)
EOSINOPHIL # BLD: 5 % (ref 0–4.5)
GLUCOSE SERPL-MCNC: 181 MG/DL (ref 74–106)
HCT VFR BLD CALC: 41.1 % (ref 32.4–45.2)
HGB BLD-MCNC: 13.8 GM/DL (ref 10.7–15.3)
LYMPHOCYTES # BLD: 33.4 % (ref 8–40)
MAGNESIUM SERPL-MCNC: 1.6 MG/DL (ref 1.8–2.4)
MCH RBC QN AUTO: 28.7 PG (ref 25.7–33.7)
MCHC RBC AUTO-ENTMCNC: 33.5 G/DL (ref 32–36)
MCV RBC: 85.6 FL (ref 80–96)
MONOCYTES # BLD AUTO: 8.8 % (ref 3.8–10.2)
NEUTROPHILS # BLD: 51.8 % (ref 42.8–82.8)
PLATELET # BLD AUTO: 166 10^3/UL (ref 134–434)
PLATELET BLD QL SMEAR: ADEQUATE
PMV BLD: 8.6 FL (ref 7.5–11.1)
POTASSIUM SERPLBLD-SCNC: 4.5 MMOL/L (ref 3.5–5.1)
PROT SERPL-MCNC: 7.3 G/DL (ref 6.4–8.2)
RBC # BLD AUTO: 4.8 M/MM3 (ref 3.6–5.2)
SODIUM SERPL-SCNC: 140 MMOL/L (ref 136–145)
WBC # BLD AUTO: 7.7 K/MM3 (ref 4–10)

## 2023-04-27 PROCEDURE — G0378 HOSPITAL OBSERVATION PER HR: HCPCS

## 2023-04-27 PROCEDURE — 3E013VG INTRODUCTION OF INSULIN INTO SUBCUTANEOUS TISSUE, PERCUTANEOUS APPROACH: ICD-10-PCS | Performed by: INTERNAL MEDICINE

## 2023-04-27 PROCEDURE — 3E033GC INTRODUCTION OF OTHER THERAPEUTIC SUBSTANCE INTO PERIPHERAL VEIN, PERCUTANEOUS APPROACH: ICD-10-PCS | Performed by: INTERNAL MEDICINE

## 2023-04-27 PROCEDURE — 3E0F7GC INTRODUCTION OF OTHER THERAPEUTIC SUBSTANCE INTO RESPIRATORY TRACT, VIA NATURAL OR ARTIFICIAL OPENING: ICD-10-PCS | Performed by: INTERNAL MEDICINE

## 2023-04-27 RX ADMIN — SACUBITRIL AND VALSARTAN SCH TAB: 24; 26 TABLET, FILM COATED ORAL at 21:51

## 2023-04-27 RX ADMIN — MAGNESIUM OXIDE TAB 400 MG (241.3 MG ELEMENTAL MG) SCH: 400 (241.3 MG) TAB at 21:51

## 2023-04-27 RX ADMIN — INSULIN ASPART SCH UNITS: 100 INJECTION, SOLUTION INTRAVENOUS; SUBCUTANEOUS at 21:51

## 2023-04-27 RX ADMIN — INSULIN ASPART SCH UNITS: 100 INJECTION, SOLUTION INTRAVENOUS; SUBCUTANEOUS at 17:54

## 2023-04-27 RX ADMIN — BUDESONIDE AND FORMOTEROL FUMARATE DIHYDRATE SCH PUFF: 160; 4.5 AEROSOL RESPIRATORY (INHALATION) at 22:52

## 2023-04-27 RX ADMIN — APIXABAN SCH MG: 5 TABLET, FILM COATED ORAL at 21:51

## 2023-04-28 VITALS — SYSTOLIC BLOOD PRESSURE: 134 MMHG | DIASTOLIC BLOOD PRESSURE: 66 MMHG | TEMPERATURE: 98.4 F | HEART RATE: 93 BPM

## 2023-04-28 VITALS — RESPIRATION RATE: 20 BRPM

## 2023-04-28 LAB
ALBUMIN SERPL-MCNC: 3.2 G/DL (ref 3.4–5)
ALP SERPL-CCNC: 63 U/L (ref 45–117)
ALT SERPL-CCNC: 26 U/L (ref 13–61)
ANION GAP SERPL CALC-SCNC: 7 MMOL/L (ref 8–16)
AST SERPL-CCNC: 18 U/L (ref 15–37)
BASOPHILS # BLD: 0 % (ref 0–2)
BILIRUB SERPL-MCNC: 0.6 MG/DL (ref 0.2–1)
BUN SERPL-MCNC: 22.2 MG/DL (ref 7–18)
CALCIUM SERPL-MCNC: 9.2 MG/DL (ref 8.5–10.1)
CHLORIDE SERPL-SCNC: 107 MMOL/L (ref 98–107)
CO2 SERPL-SCNC: 23 MMOL/L (ref 21–32)
CREAT SERPL-MCNC: 0.8 MG/DL (ref 0.55–1.3)
DEPRECATED RDW RBC AUTO: 14.4 % (ref 11.6–15.6)
EOSINOPHIL # BLD: 0 % (ref 0–4.5)
GLUCOSE SERPL-MCNC: 191 MG/DL (ref 74–106)
HCT VFR BLD CALC: 38.1 % (ref 32.4–45.2)
HGB BLD-MCNC: 13 GM/DL (ref 10.7–15.3)
LYMPHOCYTES # BLD: 13.1 % (ref 8–40)
MAGNESIUM SERPL-MCNC: 2 MG/DL (ref 1.8–2.4)
MCH RBC QN AUTO: 29 PG (ref 25.7–33.7)
MCHC RBC AUTO-ENTMCNC: 34.1 G/DL (ref 32–36)
MCV RBC: 84.9 FL (ref 80–96)
MONOCYTES # BLD AUTO: 3.3 % (ref 3.8–10.2)
NEUTROPHILS # BLD: 83.6 % (ref 42.8–82.8)
PHOSPHATE SERPL-MCNC: 3.4 MG/DL (ref 2.5–4.9)
PLATELET # BLD AUTO: 178 10^3/UL (ref 134–434)
PMV BLD: 8.5 FL (ref 7.5–11.1)
POTASSIUM SERPLBLD-SCNC: 4.6 MMOL/L (ref 3.5–5.1)
PROT SERPL-MCNC: 6.5 G/DL (ref 6.4–8.2)
RBC # BLD AUTO: 4.49 M/MM3 (ref 3.6–5.2)
SODIUM SERPL-SCNC: 137 MMOL/L (ref 136–145)
WBC # BLD AUTO: 8.9 K/MM3 (ref 4–10)

## 2023-04-28 RX ADMIN — INSULIN ASPART SCH UNITS: 100 INJECTION, SOLUTION INTRAVENOUS; SUBCUTANEOUS at 12:05

## 2023-04-28 RX ADMIN — INSULIN ASPART SCH UNITS: 100 INJECTION, SOLUTION INTRAVENOUS; SUBCUTANEOUS at 06:12

## 2023-04-28 RX ADMIN — MAGNESIUM OXIDE TAB 400 MG (241.3 MG ELEMENTAL MG) SCH MG: 400 (241.3 MG) TAB at 09:42

## 2023-04-28 RX ADMIN — BUDESONIDE AND FORMOTEROL FUMARATE DIHYDRATE SCH PUFF: 160; 4.5 AEROSOL RESPIRATORY (INHALATION) at 09:43

## 2023-04-28 RX ADMIN — MAGNESIUM OXIDE TAB 400 MG (241.3 MG ELEMENTAL MG) SCH: 400 (241.3 MG) TAB at 09:52

## 2023-04-28 RX ADMIN — SACUBITRIL AND VALSARTAN SCH TAB: 24; 26 TABLET, FILM COATED ORAL at 09:42

## 2023-04-28 RX ADMIN — APIXABAN SCH MG: 5 TABLET, FILM COATED ORAL at 09:42
